# Patient Record
Sex: MALE | Race: WHITE | NOT HISPANIC OR LATINO | Employment: FULL TIME | URBAN - NONMETROPOLITAN AREA
[De-identification: names, ages, dates, MRNs, and addresses within clinical notes are randomized per-mention and may not be internally consistent; named-entity substitution may affect disease eponyms.]

---

## 2019-06-17 ENCOUNTER — NURSE TRIAGE (OUTPATIENT)
Dept: CALL CENTER | Facility: HOSPITAL | Age: 24
End: 2019-06-17

## 2019-06-17 NOTE — TELEPHONE ENCOUNTER
"Caller is wanting an office appointment, and will be calling back in the morning    Reason for Disposition  • Caller requesting an appointment, triage offered and declined    Additional Information  • Negative: Lab calling with strep throat test results and triager can call in prescription  • Negative: Lab calling with urinalysis test results and triager can call in prescription  • Negative: Medication questions  • Negative: ED call to PCP  • Negative: Physician call to PCP  • Negative: Call about patient who is currently hospitalized  • Negative: Lab or radiology calling with CRITICAL test results  • Negative: [1] Prescription not at pharmacy AND [2] was prescribed today by PCP  • Negative: [1] Follow-up call from patient regarding patient's clinical status AND [2] information urgent  • Negative: [1] Caller requests to speak ONLY to PCP AND [2] URGENT question  • Negative: [1] Caller requests to speak to PCP now AND [2] won't tell us reason for call  (Exception: if 10 pm to 6 am, caller must first discuss reason for the call)  • Negative: Notification of hospital admission  • Negative: Notification of death  • Negative: Caller requesting lab results  • Negative: Lab or radiology calling with test results  • Negative: [1] Follow-up call from patient regarding patient's clinical status AND [2] information NON-URGENT  • Negative: [1] Caller requests to speak ONLY to PCP AND [2] NON-URGENT question    Answer Assessment - Initial Assessment Questions  1. REASON FOR CALL or QUESTION: \"What is your reason for calling today?\" or \"How can I best  help you?\" or \"What question do you have that I can help answer?\"       Requesting a appointment in the moorning  2. CALLER: Document the source of call. (e.g., laboratory, patient).     patient    Protocols used: PCP CALL - NO TRIAGE-ADULT-AH      "

## 2022-03-21 ENCOUNTER — OFFICE VISIT (OUTPATIENT)
Dept: FAMILY MEDICINE CLINIC | Facility: CLINIC | Age: 27
End: 2022-03-21

## 2022-03-21 VITALS
HEART RATE: 84 BPM | WEIGHT: 195.2 LBS | TEMPERATURE: 97.7 F | BODY MASS INDEX: 28.91 KG/M2 | SYSTOLIC BLOOD PRESSURE: 116 MMHG | HEIGHT: 69 IN | DIASTOLIC BLOOD PRESSURE: 75 MMHG | OXYGEN SATURATION: 98 %

## 2022-03-21 DIAGNOSIS — F41.9 ANXIETY: ICD-10-CM

## 2022-03-21 DIAGNOSIS — E11.649 TYPE 2 DIABETES MELLITUS WITH HYPOGLYCEMIA WITHOUT COMA, WITHOUT LONG-TERM CURRENT USE OF INSULIN: ICD-10-CM

## 2022-03-21 DIAGNOSIS — E16.2 HYPOGLYCEMIA: Primary | ICD-10-CM

## 2022-03-21 LAB
EXPIRATION DATE: NORMAL
HBA1C MFR BLD: 4.9 %
Lab: NORMAL

## 2022-03-21 PROCEDURE — 99203 OFFICE O/P NEW LOW 30 MIN: CPT | Performed by: NURSE PRACTITIONER

## 2022-03-21 PROCEDURE — 83036 HEMOGLOBIN GLYCOSYLATED A1C: CPT | Performed by: NURSE PRACTITIONER

## 2022-03-21 RX ORDER — CITALOPRAM 10 MG/1
10 TABLET ORAL DAILY
Qty: 30 TABLET | Refills: 2 | Status: SHIPPED | OUTPATIENT
Start: 2022-03-21 | End: 2022-03-28

## 2022-03-21 NOTE — PROGRESS NOTES
"Chief Complaint   Patient presents with   • Panic Attack   • Establish Care        Subjective   Will Esqueda is a 26 y.o. male who presents today for possible hypoglycemia. Has \"attacks\" after strenuous activities. Mother just diagnosed with diabetes. Had blood work recently drawn. Is unremarkable.Pt thinks if it is not his sugar it is anxiety. Breaks out in a sweat when occurs.     Panic Attack  This is a new problem. The current episode started more than 1 month ago. The problem occurs 2 to 4 times per day. The problem has been waxing and waning.          No Known Allergies      OBJECTIVE:  Vitals:    03/21/22 1049   BP: 116/75   BP Location: Right arm   Patient Position: Sitting   Cuff Size: Large Adult   Pulse: 84   Temp: 97.7 °F (36.5 °C)   SpO2: 98%   Weight: 88.5 kg (195 lb 3.2 oz)   Height: 175.3 cm (69\")     Physical Exam  Vitals and nursing note reviewed.   Constitutional:       Appearance: Normal appearance.   HENT:      Head: Normocephalic and atraumatic.      Nose: Nose normal.      Mouth/Throat:      Mouth: Mucous membranes are dry.   Eyes:      Extraocular Movements: Extraocular movements intact.      Pupils: Pupils are equal, round, and reactive to light.   Cardiovascular:      Rate and Rhythm: Normal rate and regular rhythm.      Pulses: Normal pulses.      Heart sounds: Normal heart sounds.   Pulmonary:      Effort: Pulmonary effort is normal.      Breath sounds: Normal breath sounds.   Abdominal:      General: Abdomen is flat. Bowel sounds are normal.   Musculoskeletal:         General: Normal range of motion.      Cervical back: Normal range of motion and neck supple.   Skin:     General: Skin is warm.   Neurological:      Mental Status: He is alert.   Psychiatric:         Mood and Affect: Mood normal.         Behavior: Behavior normal.         Patient's Body mass index is 28.83 kg/m². indicating that he is overweight (BMI 25-29.9). Patient's (Body mass index is 28.83 kg/m².) indicates " that they are overweight with health conditions that include none . Weight is improving with lifestyle modifications. BMI is is above average; no BMI management plan is appropriate. We discussed low calorie, low carb based diet program, portion control and increasing exercise. .        ASSESSMENT/ PLAN:    Diagnoses and all orders for this visit:    1. Hypoglycemia (Primary)    2. Type 2 diabetes mellitus with hypoglycemia without coma, without long-term current use of insulin (HCC)  -     POC Glycosylated Hemoglobin (Hb A1C)    3. Anxiety  -     citalopram (CeleXA) 10 MG tablet; Take 1 tablet by mouth Daily.  Dispense: 30 tablet; Refill: 2          Management Plan:     An After Visit Summary was printed and given to the patient at discharge.    Follow-up: Return in about 1 week (around 3/28/2022), or if symptoms worsen or fail to improve.    Christian Tenorio, APRN 3/21/2022 11:48 CDT  This note was electronically signed.

## 2022-03-23 ENCOUNTER — PATIENT ROUNDING (BHMG ONLY) (OUTPATIENT)
Dept: FAMILY MEDICINE CLINIC | Facility: CLINIC | Age: 27
End: 2022-03-23

## 2022-03-23 NOTE — PROGRESS NOTES
"March 23, 2022    Hello, may I speak with Will Esqueda?    My name is Lay     I am  with [facility]  [address] [facility] [address] [phone].    Before we get started may I verify your date of birth? 1995    I am calling to officially welcome you to our practice and ask about your recent visit. Is this a good time to talk?     Tell me about your visit with us. What things went well? \"I was really pleased, she was very thorough. I appreciated that she actually asked questions.\"     We're always looking for ways to make our patients' experiences even better. Do you have recommendations on ways we may improve?  \"No, I was very satisfied.\"    Overall were you satisfied with your first visit to our practice? \"I was very happy.\"        I appreciate you taking the time to speak with me today. Is there anything else I can do for you? \"No, thank you.\"      Thank you, and have a great day.      "

## 2022-03-28 ENCOUNTER — OFFICE VISIT (OUTPATIENT)
Dept: FAMILY MEDICINE CLINIC | Facility: CLINIC | Age: 27
End: 2022-03-28

## 2022-03-28 VITALS
HEART RATE: 69 BPM | SYSTOLIC BLOOD PRESSURE: 118 MMHG | TEMPERATURE: 97.6 F | WEIGHT: 194.6 LBS | BODY MASS INDEX: 28.82 KG/M2 | DIASTOLIC BLOOD PRESSURE: 73 MMHG | OXYGEN SATURATION: 98 % | HEIGHT: 69 IN

## 2022-03-28 DIAGNOSIS — F41.9 ANXIETY: Primary | ICD-10-CM

## 2022-03-28 PROCEDURE — 99213 OFFICE O/P EST LOW 20 MIN: CPT | Performed by: NURSE PRACTITIONER

## 2022-03-28 RX ORDER — FLUOXETINE 10 MG/1
10 CAPSULE ORAL DAILY
Qty: 30 CAPSULE | Refills: 5 | Status: SHIPPED | OUTPATIENT
Start: 2022-03-28 | End: 2022-04-27

## 2022-03-28 NOTE — PROGRESS NOTES
"Chief Complaint   Patient presents with   • Loss of Consciousness     Patient was taking Celexa. Patient advised he has stopped due to side effects        Subjective   Will Esqueda is a 26 y.o. male who presents today for syncopal episodes and anxiety.    HPI   Patient still having syncopal episodes. States celexa helped except it made him not care about anything. Loss of concentration.    No Known Allergies      OBJECTIVE:  Vitals:    03/28/22 1003   BP: 118/73   BP Location: Right arm   Patient Position: Sitting   Cuff Size: Large Adult   Pulse: 69   Temp: 97.6 °F (36.4 °C)   TempSrc: Infrared   SpO2: 98%   Weight: 88.3 kg (194 lb 9.6 oz)   Height: 175.3 cm (69.02\")     Physical Exam  Vitals and nursing note reviewed.   Constitutional:       Appearance: Normal appearance.   HENT:      Head: Normocephalic and atraumatic.      Nose: Nose normal.      Mouth/Throat:      Mouth: Mucous membranes are moist.   Eyes:      Extraocular Movements: Extraocular movements intact.      Pupils: Pupils are equal, round, and reactive to light.   Cardiovascular:      Rate and Rhythm: Normal rate and regular rhythm.      Pulses: Normal pulses.      Heart sounds: Normal heart sounds.   Pulmonary:      Effort: Pulmonary effort is normal.      Breath sounds: Normal breath sounds.   Abdominal:      General: Abdomen is flat. Bowel sounds are normal.   Musculoskeletal:         General: Normal range of motion.      Cervical back: Normal range of motion and neck supple.   Skin:     General: Skin is warm.   Neurological:      Mental Status: He is alert.   Psychiatric:         Mood and Affect: Mood normal.         Behavior: Behavior normal.         Patient's Body mass index is 28.72 kg/m². indicating that he is overweight (BMI 25-29.9). Patient's (Body mass index is 28.72 kg/m².) indicates that they are overweight with health conditions that include none . Weight is unchanged. BMI is is above average; no BMI management plan is " appropriate. We discussed low calorie, low carb based diet program, portion control and increasing exercise. .        ASSESSMENT/ PLAN:    Diagnoses and all orders for this visit:    1. Anxiety (Primary)  -     FLUoxetine (PROzac) 10 MG capsule; Take 1 capsule by mouth Daily for 30 days.  Dispense: 30 capsule; Refill: 5          Management Plan:     An After Visit Summary was printed and given to the patient at discharge.    Follow-up: Return in about 1 week (around 4/4/2022), or if symptoms worsen or fail to improve.    I spent 20 minutes caring for Will on this date of service. This time includes time spent by me in the following activities: preparing for the visit, obtaining and/or reviewing a separately obtained history, performing a medically appropriate examination and/or evaluation, ordering medications, tests, or procedures and documenting information in the medical record     ROBERT Jones 3/28/2022 10:19 CDT  This note was electronically signed.

## 2022-05-12 DIAGNOSIS — F33.1 MAJOR DEPRESSIVE DISORDER, RECURRENT, MODERATE: Primary | ICD-10-CM

## 2022-05-12 RX ORDER — CITALOPRAM 10 MG/1
10 TABLET ORAL DAILY
Qty: 90 TABLET | Refills: 3 | Status: SHIPPED | OUTPATIENT
Start: 2022-05-12